# Patient Record
Sex: MALE | Race: WHITE | NOT HISPANIC OR LATINO
[De-identification: names, ages, dates, MRNs, and addresses within clinical notes are randomized per-mention and may not be internally consistent; named-entity substitution may affect disease eponyms.]

---

## 2023-04-05 ENCOUNTER — NON-APPOINTMENT (OUTPATIENT)
Age: 33
End: 2023-04-05

## 2023-04-05 PROBLEM — Z00.00 ENCOUNTER FOR PREVENTIVE HEALTH EXAMINATION: Status: ACTIVE | Noted: 2023-04-05

## 2023-04-06 ENCOUNTER — APPOINTMENT (OUTPATIENT)
Dept: OTOLARYNGOLOGY | Facility: CLINIC | Age: 33
End: 2023-04-06
Payer: COMMERCIAL

## 2023-04-06 VITALS — BODY MASS INDEX: 21.6 KG/M2 | HEIGHT: 71 IN | WEIGHT: 154.32 LBS

## 2023-04-06 DIAGNOSIS — R59.0 LOCALIZED ENLARGED LYMPH NODES: ICD-10-CM

## 2023-04-06 DIAGNOSIS — R07.0 PAIN IN THROAT: ICD-10-CM

## 2023-04-06 PROCEDURE — 99203 OFFICE O/P NEW LOW 30 MIN: CPT

## 2023-04-07 PROBLEM — R07.0 THROAT PAIN: Status: ACTIVE | Noted: 2023-04-07

## 2023-04-07 PROBLEM — R59.0 CERVICAL LYMPHADENOPATHY: Status: ACTIVE | Noted: 2023-04-07

## 2023-04-07 NOTE — PROCEDURE
RN assumed pt care at this time  Pt in room eating lunch  No visible signs of distress   1:1 continual observation being recorded by JOE Ventura RN  01/31/22 9750 [de-identified] : PROCEDURE: Flexible laryngoscopy\par SURGEON: Dr. Lancaster\par INDICATIONS: He was unable to tolerate a mirror exam. Assess for laryngopharynx pharyngeal reflux. cough. head and neck mass. \par ANESTHESIA: The patient was placed in a sitting position.  Following application of the topical anesthetic and decongestant, exam was performed with a flexible scope.  The scope was passed along the right nasal floor to the nasopharynx.  It was then passed into the region of the middle meatus, middle turbinate, and sphenoethmoid region.  An identical procedure was performed on the left side.  The following findings were noted:\par \par The nasal musoca was healthy appearing and the septum was roughly midline. The middle meatus and sphenoethmoid recesses were clear bilaterally. The nasopharynx \par \par Nasopharynx: no masses, choanae patent, no adenoid tissue\par \par Base of tongue/vallecula: no masses or asymmetry\par Pharyngeal walls: symmetrical. No masses.\par Pyriform sinuses: no lesions or pooling of secretions.\par Epiglottis: normal. No edema or lesions.\par Aryepiglottic folds: normal. No lesions. \par Vocal cords: clear and mobile. No lesions. Airway patent.\par Arytenoids: no edema or erythema. \par Interarytenoid area: no edema, erythema or lesion.\par

## 2023-04-07 NOTE — ASSESSMENT
[FreeTextEntry1] : Shotty reactive lymphadenopathy.  I would recommend observation.  We did discuss the utility of sonogram.\par No evidence of malignancy or infection.\par Follow-up pending his clinical course.

## 2023-04-07 NOTE — HISTORY OF PRESENT ILLNESS
[de-identified] : Initial visit.\par Chief complaint is "lumps in the throat/neck".\par He reports that he often palpates lumps in his neck.  They tend to come and go.  The one that is most prominent is in the back of his neck.\par He does however report that he was doing some martial arts and developed some throat discomfort.  This is low-grade.\par He does not report any change in voice, dysphagia, unexplained weight loss or hemoptysis.\par He does not report any constitutional symptoms. [FreeTextEntry1] : cpe [de-identified] : Patient is here today for a physical.  \par He has been feeling well, no complaints. \par He has been taking his medication as prescribed.